# Patient Record
Sex: FEMALE | Race: WHITE | Employment: FULL TIME | URBAN - NONMETROPOLITAN AREA
[De-identification: names, ages, dates, MRNs, and addresses within clinical notes are randomized per-mention and may not be internally consistent; named-entity substitution may affect disease eponyms.]

---

## 2024-10-23 ENCOUNTER — HOSPITAL ENCOUNTER (OUTPATIENT)
Dept: GENERAL RADIOLOGY | Age: 54
Discharge: HOME | End: 2024-10-23
Payer: COMMERCIAL

## 2024-10-23 ENCOUNTER — NURSE TRIAGE (OUTPATIENT)
Dept: OTHER | Facility: CLINIC | Age: 54
End: 2024-10-23
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
## 2024-10-23 DIAGNOSIS — S69.91XA INJURY OF HAND, RIGHT, INITIAL ENCOUNTER: ICD-10-CM

## 2024-10-23 PROCEDURE — 73130 X-RAY EXAM OF HAND: CPT

## 2024-10-23 NOTE — TELEPHONE ENCOUNTER
Location of patient: [place]    Location of employment: [employer]    Department where injury occurred: Mammography    Location of injury (body part involved): right hand/wrist    Time of injury: 10/23/2024 1400    Last 4 of SSN: [id number]    Location associate referred to for care: [address] [place] [address] , [facility]     Subjective: Caller states \"positioning patient for the first image and while turning to go to the machine to take the images, tripped over patients cane, and stuck out right hand to brace upon falling to ground, blunt force to right hand/wrist to brace fall\"     Current Symptoms:   -right wrist hand/wrist pain with swelling size of ping pong ball  -unable to make a fist at time of call    Pain Severity: 8/10; throbbing; constant    Temperature: NA     What has been tried:   -ice  -ibuprofen    Recommended disposition: See in Office Today    Care advice provided, caller verbalizes understanding; denies any other questions or concerns.    Agrees to use approved location for occupational health      Reason for Disposition   SEVERE pain (e.g., excruciating, unable to use wrist at all)    Protocols used: Wrist Injury-ADULT-[place]

## 2024-10-29 ENCOUNTER — OFFICE VISIT (OUTPATIENT)
Age: 54
End: 2024-10-29
Payer: COMMERCIAL

## 2024-10-29 VITALS — HEIGHT: 62 IN | WEIGHT: 190 LBS | BODY MASS INDEX: 34.96 KG/M2

## 2024-10-29 DIAGNOSIS — S76.011D TEAR OF RIGHT GLUTEUS MEDIUS TENDON, SUBSEQUENT ENCOUNTER: ICD-10-CM

## 2024-10-29 DIAGNOSIS — M25.551 RIGHT HIP PAIN: Primary | ICD-10-CM

## 2024-10-29 PROBLEM — S76.011A TEAR OF RIGHT GLUTEUS MEDIUS TENDON: Status: ACTIVE | Noted: 2024-10-29

## 2024-10-29 PROCEDURE — 99212 OFFICE O/P EST SF 10 MIN: CPT | Performed by: PHYSICIAN ASSISTANT

## 2024-10-29 NOTE — PROGRESS NOTES
Orthopaedic Clinic Note - Postop    NAME:  Deb Brown   : 1970  MRN: [id number]      10/29/2024     CHIEF COMPLAINT:   Chief Complaint   Patient presents with    Follow-up     Right hip       HISTORY OF PRESENT ILLNESS:    Patient returns today for follow up of the right hip.  Pain has  improved with some continued soreness and weakness.  She ambulates with a cane . There were no postoperative complications.         Past Medical History:        Diagnosis Date    COVID 2024       Past Surgical History:        Procedure Laterality Date     SECTION      HIP SURGERY Right 3/27/2024    RIGHT GLUTEAL MEDIAL REPAIR performed by Jaleel Diaz MD at [facility] OR    HYSTERECTOMY (CERVIX STATUS UNKNOWN)      RECTAL SURGERY      TONSILLECTOMY      WRIST FRACTURE SURGERY Left        Current Medications:   Prior to Admission medications    Medication Sig Start Date End Date Taking? Authorizing Provider   ondansetron (ZOFRAN) 4 MG tablet Take 1 tablet by mouth every 8 hours as needed for Nausea or Vomiting 3/27/24   Jaleel Diaz MD   cyclobenzaprine (FLEXERIL) 10 MG tablet Take 1 tablet by mouth 3 times daily as needed for Muscle spasms 3/27/24   Jaleel Diaz MD   aspirin 81 MG EC tablet Take 1 tablet by mouth 2 times daily 3/27/24 5/8/24  Jaleel Diaz MD   zolpidem (AMBIEN) 10 MG tablet Take 1 tablet by mouth nightly as needed for Sleep.    Provider, MD Terrence       Allergies:    Detrol [tolterodine]      PHYSICAL EXAM:    Musculoskeletal: right hip abduction strength is improving; she can elevate about a foot or so from the bottom leg.  Full SLR.  Incisions are clean, dry, and intact.  No signs of infection. Neurovascularly intact.    Radiology:   none        --------------------------------------------------------------------------------------------------------------------    Assessment:   Aftercare following musculoskeletal surgery as above  Encounter Diagnoses   Name Primary?    Right hip

## 2024-12-05 ENCOUNTER — OFFICE VISIT (OUTPATIENT)
Age: 54
End: 2024-12-05

## 2024-12-05 VITALS — HEIGHT: 62 IN | WEIGHT: 188.2 LBS | BODY MASS INDEX: 34.63 KG/M2

## 2024-12-05 DIAGNOSIS — S76.011D TEAR OF RIGHT GLUTEUS MEDIUS TENDON, SUBSEQUENT ENCOUNTER: Primary | ICD-10-CM

## 2024-12-05 RX ORDER — DIAZEPAM 2 MG/1
TABLET ORAL
COMMUNITY

## 2024-12-05 NOTE — PROGRESS NOTES
about a 3+ to 4- out of 5 at best on the right side      Imaging:    None      Diagnosis:    ICD-10-CM    1. Tear of right gluteus medius tendon, subsequent encounter  S76.011D            Assessment:     Right gluteus medius and minimus tendon repair 3/27/2024    Plan Narrative  Plan at this point she can be return to work without restrictions.  I did give her some exercises and shoulder exercises to do in the office.  For some apparent reason [facility] wants her to be seen every month which I think is a little bit overkill.  I will see her back in 6 weeks and see if we can then release her and then see her back probably for her 2-year check.  Thank    Return in about 6 weeks (around 1/16/2025).        Patient given educational materials - see patient instructions.   Discussed use, benefit, and side effects of prescribed medications.  All patient questions answered.  Pt voiced understanding. Patient agreed with treatment plan. Follow up as needed.    This dictation was generated by voice recognition computer software. Although all attempts are made to edit the dictation for accuracy, there may be errors in the transcription that are not intended.    Electronically signed by Jaleel Diaz MD on 12/5/2024 at 4:20 PM.

## 2025-01-27 LAB
ALBUMIN SERPL-MCNC: 4.2 G/DL (ref 3.5–5.2)
ALP SERPL-CCNC: 114 U/L (ref 35–104)
ALT SERPL-CCNC: 17 U/L (ref 5–33)
ANION GAP SERPL CALCULATED.3IONS-SCNC: 9 MMOL/L (ref 8–16)
AST SERPL-CCNC: 20 U/L (ref 5–32)
BACTERIA URNS QL MICRO: NEGATIVE /HPF
BASOPHILS # BLD: 0.1 K/UL (ref 0–0.2)
BASOPHILS NFR BLD: 1.4 % (ref 0–1)
BILIRUB SERPL-MCNC: 0.3 MG/DL (ref 0.2–1.2)
BILIRUB UR QL STRIP: NEGATIVE
BUN SERPL-MCNC: 13 MG/DL (ref 6–20)
CALCIUM SERPL-MCNC: 9.3 MG/DL (ref 8.6–10)
CHLORIDE SERPL-SCNC: 102 MMOL/L (ref 98–107)
CHOLEST SERPL-MCNC: 151 MG/DL (ref 0–199)
CLARITY UR: CLEAR
CO2 SERPL-SCNC: 29 MMOL/L (ref 22–29)
COLOR UR: YELLOW
CREAT SERPL-MCNC: 0.7 MG/DL (ref 0.5–0.9)
CRYSTALS URNS MICRO: NORMAL /HPF
EOSINOPHIL # BLD: 0.5 K/UL (ref 0–0.6)
EOSINOPHIL NFR BLD: 7.4 % (ref 0–5)
EPI CELLS #/AREA URNS AUTO: 3 /HPF (ref 0–5)
ERYTHROCYTE [DISTWIDTH] IN BLOOD BY AUTOMATED COUNT: 13.1 % (ref 11.5–14.5)
GAMMA GLUTAMYL TRANSFERASE: 78 U/L (ref 5–36)
GLUCOSE SERPL-MCNC: 89 MG/DL (ref 70–99)
GLUCOSE UR STRIP.AUTO-MCNC: NEGATIVE MG/DL
HBA1C MFR BLD: 5.3 % (ref 4–5.6)
HCT VFR BLD AUTO: 42.9 % (ref 37–47)
HDLC SERPL-MCNC: 57 MG/DL (ref 40–60)
HGB BLD-MCNC: 13.7 G/DL (ref 12–16)
HGB UR STRIP.AUTO-MCNC: NEGATIVE MG/L
HYALINE CASTS #/AREA URNS AUTO: 6 /HPF (ref 0–8)
IMM GRANULOCYTES # BLD: 0 K/UL
KETONES UR STRIP.AUTO-MCNC: ABNORMAL MG/DL
LDLC SERPL CALC-MCNC: 80 MG/DL
LEUKOCYTE ESTERASE UR QL STRIP.AUTO: ABNORMAL
LYMPHOCYTES # BLD: 2.2 K/UL (ref 1.1–4.5)
LYMPHOCYTES NFR BLD: 34.7 % (ref 20–40)
MCH RBC QN AUTO: 27.1 PG (ref 27–31)
MCHC RBC AUTO-ENTMCNC: 31.9 G/DL (ref 33–37)
MCV RBC AUTO: 85 FL (ref 81–99)
MONOCYTES # BLD: 0.5 K/UL (ref 0–0.9)
MONOCYTES NFR BLD: 7.1 % (ref 0–10)
NEUTROPHILS # BLD: 3.1 K/UL (ref 1.5–7.5)
NEUTS SEG NFR BLD: 49.2 % (ref 50–65)
NITRITE UR QL STRIP.AUTO: NEGATIVE
PH UR STRIP.AUTO: 5.5 [PH] (ref 5–8)
PLATELET # BLD AUTO: 287 K/UL (ref 130–400)
PMV BLD AUTO: 11.5 FL (ref 9.4–12.3)
POTASSIUM SERPL-SCNC: 3.9 MMOL/L (ref 3.5–5.1)
PROT SERPL-MCNC: 7.3 G/DL (ref 6.4–8.3)
PROT UR STRIP.AUTO-MCNC: ABNORMAL MG/DL
RBC # BLD AUTO: 5.05 M/UL (ref 4.2–5.4)
RBC #/AREA URNS AUTO: 1 /HPF (ref 0–4)
SODIUM SERPL-SCNC: 140 MMOL/L (ref 136–145)
SP GR UR STRIP.AUTO: 1.03 (ref 1–1.03)
TRIGL SERPL-MCNC: 70 MG/DL (ref 0–149)
TSH SERPL DL<=0.005 MIU/L-ACNC: 1.54 UIU/ML (ref 0.27–4.2)
UROBILINOGEN UR STRIP.AUTO-MCNC: 0.2 E.U./DL
WBC # BLD AUTO: 6.3 K/UL (ref 4.8–10.8)
WBC #/AREA URNS AUTO: 3 /HPF (ref 0–5)

## 2025-02-24 ENCOUNTER — HOSPITAL ENCOUNTER (OUTPATIENT)
Dept: WOMENS IMAGING | Age: 55
Discharge: HOME | End: 2025-02-24
Payer: COMMERCIAL

## 2025-02-24 VITALS — BODY MASS INDEX: 32.57 KG/M2 | HEIGHT: 62 IN | WEIGHT: 177 LBS

## 2025-02-24 DIAGNOSIS — Z12.31 ENCOUNTER FOR SCREENING MAMMOGRAM FOR MALIGNANT NEOPLASM OF BREAST: ICD-10-CM

## 2025-02-24 PROCEDURE — 77063 BREAST TOMOSYNTHESIS BI: CPT

## 2025-07-21 LAB
CHOLEST SERPL-MCNC: 185 MG/DL (ref 0–199)
GLUCOSE SERPL-MCNC: 87 MG/DL (ref 70–99)
HDLC SERPL-MCNC: 54 MG/DL (ref 40–60)
LDLC SERPL CALC-MCNC: 107 MG/DL
TRIGL SERPL-MCNC: 122 MG/DL (ref 0–149)

## 2025-08-26 ENCOUNTER — OFFICE VISIT (OUTPATIENT)
Dept: ENT CLINIC | Age: 55
End: 2025-08-26
Payer: COMMERCIAL

## 2025-08-26 VITALS
DIASTOLIC BLOOD PRESSURE: 80 MMHG | HEIGHT: 62 IN | SYSTOLIC BLOOD PRESSURE: 128 MMHG | BODY MASS INDEX: 32.39 KG/M2 | WEIGHT: 176 LBS

## 2025-08-26 DIAGNOSIS — S09.91XA TRAUMA OF EAR CANAL, INITIAL ENCOUNTER: Primary | ICD-10-CM

## 2025-08-26 PROCEDURE — 99203 OFFICE O/P NEW LOW 30 MIN: CPT | Performed by: OTOLARYNGOLOGY

## 2025-08-26 PROCEDURE — 92504 EAR MICROSCOPY EXAMINATION: CPT | Performed by: OTOLARYNGOLOGY

## 2025-08-26 ASSESSMENT — ENCOUNTER SYMPTOMS
ALLERGIC/IMMUNOLOGIC NEGATIVE: 1
RESPIRATORY NEGATIVE: 1
GASTROINTESTINAL NEGATIVE: 1
EYES NEGATIVE: 1